# Patient Record
Sex: MALE | Race: WHITE | Employment: OTHER | URBAN - METROPOLITAN AREA
[De-identification: names, ages, dates, MRNs, and addresses within clinical notes are randomized per-mention and may not be internally consistent; named-entity substitution may affect disease eponyms.]

---

## 2018-12-06 ENCOUNTER — OFFICE VISIT (OUTPATIENT)
Dept: VASCULAR SURGERY | Facility: CLINIC | Age: 63
End: 2018-12-06
Payer: COMMERCIAL

## 2018-12-06 VITALS
HEART RATE: 108 BPM | TEMPERATURE: 97.6 F | HEIGHT: 71 IN | SYSTOLIC BLOOD PRESSURE: 206 MMHG | DIASTOLIC BLOOD PRESSURE: 138 MMHG

## 2018-12-06 DIAGNOSIS — L97.929 CHRONIC VENOUS HTN W ULCER AND INFLAM OF BILATERAL LOW EXTRM (HCC): Primary | ICD-10-CM

## 2018-12-06 DIAGNOSIS — L97.919 CHRONIC VENOUS HTN W ULCER AND INFLAM OF BILATERAL LOW EXTRM (HCC): Primary | ICD-10-CM

## 2018-12-06 DIAGNOSIS — I87.333 CHRONIC VENOUS HTN W ULCER AND INFLAM OF BILATERAL LOW EXTRM (HCC): Primary | ICD-10-CM

## 2018-12-06 PROCEDURE — 99203 OFFICE O/P NEW LOW 30 MIN: CPT | Performed by: NURSE PRACTITIONER

## 2018-12-06 RX ORDER — TRAZODONE HYDROCHLORIDE 50 MG/1
TABLET ORAL
Refills: 0 | COMMUNITY
Start: 2018-10-26

## 2018-12-06 RX ORDER — TRAZODONE HYDROCHLORIDE 100 MG/1
TABLET ORAL
Refills: 0 | COMMUNITY
Start: 2018-11-01

## 2018-12-06 RX ORDER — ATORVASTATIN CALCIUM 80 MG/1
TABLET, FILM COATED ORAL
Refills: 0 | COMMUNITY
Start: 2018-11-05

## 2018-12-06 RX ORDER — ACETAMINOPHEN 500 MG
500 TABLET ORAL 2 TIMES DAILY PRN
Refills: 0 | COMMUNITY
Start: 2018-09-26

## 2018-12-06 RX ORDER — NAPROXEN 500 MG/1
TABLET ORAL
Refills: 0 | COMMUNITY
Start: 2018-09-26

## 2018-12-06 NOTE — PROGRESS NOTES
Assessment/Plan:  46-year-old male presents for vascular evaluation  He has history of HTN, HLD, CKD 3, chronic venous stasis changes and ulcerations of the right lateral lower extremity ongoing for the past year  He has tried local wound care w/ Unna boot therapy, compression without a good response  1  Chronic venous hypertension w/ RLE ulcer and secondary lymphedema  -We discussed options to include evaluation with with reflux study versus pneumatic compression pump    -Recommended continued use of compression, periodic leg elevation, attempt exercise and weight management in addition to  Moisturizers  -Recommended addition of pneumatic compression pumps to alleviate swelling, aid in healing and skin fibrosis  -Silver alginate dressing to right lateral calf wound every day covered by double Tubigrip size F  -Will follow up in 3 months after intitiating pneumatic compression  2  HTN   -BP elevated today  Patient anxious  -Follow up with PCP        Problem List Items Addressed This Visit        Cardiovascular and Mediastinum    Chronic venous htn w ulcer and inflam of bilateral low extrm ([address] ) - Primary    Relevant Orders    VAS reflux lower limb venous duplex study with reflux assessment, complete bilateral    Pneumatic compression pumps            Subjective:      Patient ID: Joycelyn Boland is a 66 y o  male  Patient is new to the practice and presents today for bilateral leg pain, weeping and swelling  He has not had any recent testing done  He states that he had been wearing compression for about a year but he felt that they were not helping  Patient complains of burning and tingling in both legs  He gets a burning pain when he ambulates and then rests, however the burning does not always subside with rest  He had been seeing wound care for several months, last time being seen was in August, at which time he was discharged due to wounds being healed       HPI  Patient presents to the office for evaluation of right lateral ulcerations stasis changes  He has had a right calf ulceration for approximately the past year  He has been to wound care as well as multiple doctors at the [place] and in [place]  He has utilized compression without any control  His wounds scab over and re open  He has utilized [employer] in the past   He is frustrated with the process thus far as the wounds continue to reoccur  He had a limited arterial study at outside facility which noted triphasic signals  JESSY on the right 1 0 and left 0 95  He complains of burning, trickling in bilateral lower extremities, right greater than left  He denies any history of deep or superficial venous thrombosis  He has a maternal history of venous disease  He is obese  He previously very active on his bicycle riding 30-50 miles per day though unable to do so since right lower extremity ulceration  He has become quite deconditioned  He elevates his legs occasionally  The following portions of the patient's history were reviewed and updated as appropriate: allergies, current medications, past family history, past medical history, past social history, past surgical history and problem list     Review of Systems   Constitutional: Negative  HENT: Negative  Eyes: Negative  Respiratory: Negative  Cardiovascular: Positive for leg swelling  Gastrointestinal: Negative  Endocrine: Negative  Genitourinary: Negative  Musculoskeletal: Positive for back pain and gait problem  Leg pain  Leg cramping with walking   Skin: Positive for color change and wound  Allergic/Immunologic: Negative  Neurological: Negative for light-headedness and headaches  Hematological: Negative  Psychiatric/Behavioral: Positive for agitation           Objective:    Vitals:    12/06/18 1127   BP: (!) 206/138   BP Location: Right arm   Patient Position: Sitting   Pulse: (!) 108   Temp: 97 6 °F (36 4 °C)   TempSrc: Tympanic Height: 5' 11" (1 803 m)       Patient Active Problem List   Diagnosis    Chronic venous htn w ulcer and inflam of bilateral low extrm ([address] )       Past Surgical History:   Procedure Laterality Date    TONSILLECTOMY         Family History   Problem Relation Age of Onset    Family history unknown: Yes       Social History     Social History    Marital status: Unknown     Spouse name: N/A    Number of children: N/A    Years of education: N/A     Occupational History    Not on file  Social History Main Topics    Smoking status: Current Every Day Smoker     Packs/day: 0 25    Smokeless tobacco: Never Used    Alcohol use Not on file    Drug use: Unknown    Sexual activity: Not on file     Other Topics Concern    Not on file     Social History Narrative    No narrative on file       Allergies   Allergen Reactions    Penicillins          Current Outpatient Prescriptions:     naproxen (NAPROSYN) 500 mg tablet, TAKE 1 TABLET BY MOUTH TWICE A DAY WITH FOOD AS NEEDED FOR KNEE PAIN, Disp: , Rfl: 0    traZODone (DESYREL) 50 mg tablet, TAKE ONE TABLET BY MOUTH EVERY DAY AT BEDTIME  NO REFILLS WITHOUT OFFICE VISIT, Disp: , Rfl: 0    atorvastatin (LIPITOR) 80 mg tablet, TAKE 1 BY MOUTH DAILY FOR CHOLESTEROL   NO REFILLS WITHOUT OFFICE VISIT, Disp: , Rfl: 0    MAPAP 500 MG tablet, Take 500 mg by mouth 2 (two) times a day as needed, Disp: , Rfl: 0    traZODone (DESYREL) 100 mg tablet, TAKE 1 BY MOUTH AT BEDTIME FOR SLEEP  NO REFILLS WITHOUT OFFICE VISIT , Disp: , Rfl: 0         Physical Exam   Constitutional: He is oriented to person, place, and time  He appears well-developed  obese   HENT:   Head: Normocephalic and atraumatic  Poor dentition    Eyes: EOM are normal    Neck: Neck supple  Cardiovascular: Normal heart sounds and intact distal pulses  Pulmonary/Chest: Breath sounds normal    Abdominal: Soft   Bowel sounds are normal    Musculoskeletal:   Bilateral lower extremity 1-2 + edema R>L Neurological: He is alert and oriented to person, place, and time  Skin: Skin is warm  Excessively dry skin bilateral lower extremties w/ R lateral calf 2 cm circumferential scabbed ulceration with weeping and chronic erythema of the distal 2/3 of right lower extremity    Nursing note and vitals reviewed

## 2018-12-06 NOTE — PATIENT INSTRUCTIONS
AMBULATORY CARE:   Varicose veins  are veins that become large, twisted, and swollen  They are common on the back of the calves, knees, and thighs  Varicose veins are caused by valves in your veins that do not work properly  This causes blood to collect and increase pressure in the veins of your legs  The increased pressure causes your veins to stretch, get larger, swell, and twist        Common symptoms include the following: Your symptoms may be worse after you stand or sit for long periods of time  You may have any of the following:  · Blue, purple, or bulging veins in your legs     · Pain, swelling, or muscle cramps in your legs    · Feeling of fatigue or heaviness in your legs    · Cramping in your legs  Seek care immediately if:   · You have a wound that does not heal or is infected  · You have an injury that has broken your skin and caused your varicose veins to bleed  · Your leg is swollen and hard  · You notice that your legs or feet are turning blue or black  · Your leg feels warm, tender, and painful  It may look swollen and red  Contact your healthcare provider if:   · You have pain in your leg that does not go away or gets worse  · You notice sudden large bruising on your legs  · You have a rash on your leg  · Your symptoms keep you from doing your daily activities  · You have questions or concerns about your condition or care  Treatment of varicose veins  aims to decrease symptoms, improve appearance, and prevent further problems  Treatment will depend on which veins are affected and how severe your condition is  You may need procedures to treat or remove your varicose veins  For example, your healthcare provider may inject a solution or use a laser to close the varicose veins  Surgery to remove long veins may also be done  Ask your healthcare provider for more information about procedures used to treat varicose veins    Manage varicose veins:   · Do not sit or stand for long periods of time  This can cause the blood to collect in your legs and make your symptoms worse  Bend or rotate your ankles several times every hour  Walk around for a few minutes every hour to get blood moving in your legs  · Do not cross your legs when you sit  This decreases blood flow to your feet and can make your symptoms worse  · Do not wear tight clothing or shoes  Do not wear high-heeled shoes  Do not wear clothes that are tight around the waist or knees  · Maintain a healthy weight  Being overweight or obese can make your varicose veins worse  Ask your healthcare provider how much you should weigh  Ask him or her to help you create a weight loss plan if you are overweight  · Wear pressure stockings as directed  The stockings are tight and put pressure on your legs  They improve blood flow and help prevent clots  · Elevate your legs  Keep them above the level of your heart for 15 to 30 minutes several times a day  You can also prop the end of your bed up slightly to elevate your legs while you sleep  This will help blood to flow back to your heart  · Get regular exercise  Talk to your healthcare provider about the best exercise plan for you  Exercise can improve blood flow to your legs and feet  Follow up with your healthcare provider as directed:  Write down your questions so you remember to ask them during your visits  © 2017 [address]  Information is for End User's use only and may not be sold, redistributed or otherwise used for commercial purposes  All illustrations and images included in CareNotes® are the copyrighted property of [employer]  or [employer]  The above information is an  only  It is not intended as medical advice for individual conditions or treatments  Talk to your doctor, nurse or pharmacist before following any medical regimen to see if it is safe and effective for you

## 2018-12-10 ENCOUNTER — DOCUMENTATION (OUTPATIENT)
Dept: VASCULAR SURGERY | Facility: CLINIC | Age: 63
End: 2018-12-10

## 2018-12-10 NOTE — PROGRESS NOTES
Patient came in on 12/6/2018 to see Nazario Shankar at check out there was a script faxed over to [facility] for Pneumatic compression pumps  Fax number was [phone]  Demographics page, insurance and a chart note were faxed over using this fax number      Patient was told at check out that [facility] would give a call to f/u

## 2018-12-26 ENCOUNTER — TELEPHONE (OUTPATIENT)
Dept: VASCULAR SURGERY | Facility: CLINIC | Age: 63
End: 2018-12-26

## 2018-12-26 NOTE — TELEPHONE ENCOUNTER
Patient called reporting since his appointment on 39/4 and application of compression, he has developed "many" more sores that are painful  Swelling has improved per patient and he has been compliant with compression and elevation recommendations   Sent to scheduling to make OV

## 2018-12-31 NOTE — PROGRESS NOTES
Assessment/Plan:    67 y/o obese male with HTN, HLD, CKD 3 and chronic venous stasis changes to bilateral lower extremities with venous wounds to RLE  Chronic venous htn w ulcer and inflam of bilateral low extrm ([facility])  -Chronic venous stasis changes BLE w/ inflammation and ulcerations to RLE  Will treat with Purachol and Tubigrip  Tubigrip G applied in office today and Rx given to obtain Tubigrip  -VNA services requested for patient, as he has difficulty changing dressings himself  -Referral given for wound management center  He requests to be seen at [facility]  -He is scheduled to have a venous reflux assessment to evaluate for valvular incompetence  -Encouraged frequent elevation, daily use of Tubigrip, regular activity and weight loss to help manage swelling  -Followup after imaging to discuss results and for wound recheck       Diagnoses and all orders for this visit:    Chronic venous htn w ulcer and inflam of bilateral low extrm ([address] )  -     Ambulatory referral to Wound Care; Future  -     Compression bandages  -     Ambulatory Referral to [address] [place] [address] Dyllan Brennan; Future    Other orders  -     amLODIPine (NORVASC) 10 mg tablet; Take 10 mg by mouth  -     aspirin 81 mg CHEW; Chew 81 mg  -     carvedilol (COREG) 25 mg tablet; Take 25 mg by mouth  -     hydrochlorothiazide (HYDRODIURIL) 25 mg tablet; Take 25 mg by mouth  -     acetaminophen (TYLENOL) 325 mg tablet; Take 325 mg by mouth  -     atorvastatin (LIPITOR) 80 mg tablet; Take 80 mg by mouth  -     naproxen (NAPROSYN) 500 mg tablet; Take 500 mg by mouth  -     traZODone (DESYREL) 50 mg tablet; Take 50 mg by mouth      I have spent 30 minutes with Patient and family today in which greater than 50% of this time was spent in counseling/coordination of care regarding Intructions for management, Patient and family education, Importance of tx compliance and Risk factor reductions  Subjective:      Patient ID: Christi Bowie is a 66 y o  male     Pt is here to have his right lower extremity wounds checked  Pt has redness, pain, swelling and (6) wounds to his lower right leg  Pt gets burning pain to the wounds  Pt's right leg is currently throbbing  Pt gets cramping in his right leg with walking  Pt gets relief within 3 minutes  Pt denies and numbness or tingling  Pt has had no prior surgeries to his lower extremities  Pt is currently taking Lipitor 80 mg  Patient seen for evaluation of worsening right leg venous wounds  He has chronic venous insufficiency with history of chronic swelling and prior wounds  He has been treated with Shashi Holland the past and states he has been to [facility] before  He returns to office today with complaint of new wounds to the RLE  He states that swelling went down, but then he developed sores  Sores are painful  He is treating with "lotion "  He wore Tubigrip for several days after last visit, but then states it wore out, so he stopped wearing it  He elevates often  Easily palpable PT pulse on right  The following portions of the patient's history were reviewed and updated as appropriate: allergies, current medications, past family history, past medical history, past social history, past surgical history and problem list     Review of Systems   Constitutional: Negative  Eyes: Positive for photophobia  Cardiovascular: Positive for leg swelling  Gastrointestinal: Negative  Endocrine: Negative  Genitourinary: Negative  Musculoskeletal: Positive for arthralgias and neck pain  Right leg cramping with walking   Skin: Positive for color change and wound  Allergic/Immunologic: Positive for environmental allergies  Neurological: Positive for headaches  Hematological: Negative  Psychiatric/Behavioral: The patient is nervous/anxious  Objective:       Physical Exam   Constitutional: He is oriented to person, place, and time  No distress  Obese   HENT:   Head: Normocephalic and atraumatic  Eyes: No scleral icterus  Neck: Neck supple  No JVD present  Cardiovascular:   Pulses:       Dorsalis pedis pulses are 0 on the right side  Posterior tibial pulses are 2+ on the right side  Pulmonary/Chest: Effort normal  No respiratory distress  Abdominal: Soft  Obese   Musculoskeletal: He exhibits edema (+1 edema BLE)  Neurological: He is alert and oriented to person, place, and time  Skin:   BLE venous stasis changes, multiple venous ulcerations to right lateral calf (see clinical images)   Psychiatric: He has a normal mood and affect  Vitals:    01/02/19 1507   BP: (!) 178/104   BP Location: Right arm   Patient Position: Sitting   Cuff Size: Extra-Large   Pulse: 76   Resp: 18   Temp: 98 9 °F (37 2 °C)   TempSrc: Tympanic   Weight: (!) 142 kg (314 lb)   Height: 5' 10" (1 778 m)       Patient Active Problem List   Diagnosis    Chronic venous htn w ulcer and inflam of bilateral low extrm ([address] )       Past Surgical History:   Procedure Laterality Date    TONSILLECTOMY         Family History   Problem Relation Age of Onset    Family history unknown: Yes       Social History     Social History    Marital status: Unknown     Spouse name: N/A    Number of children: N/A    Years of education: N/A     Occupational History    Not on file       Social History Main Topics    Smoking status: Current Every Day Smoker     Packs/day: 0 25    Smokeless tobacco: Never Used    Alcohol use Not on file    Drug use: Unknown    Sexual activity: Not on file     Other Topics Concern    Not on file     Social History Narrative    No narrative on file       Allergies   Allergen Reactions    Bee Venom Swelling    Penicillins Swelling         Current Outpatient Prescriptions:     acetaminophen (TYLENOL) 325 mg tablet, Take 325 mg by mouth, Disp: , Rfl:     amLODIPine (NORVASC) 10 mg tablet, Take 10 mg by mouth, Disp: , Rfl:     aspirin 81 mg CHEW, Chew 81 mg, Disp: , Rfl:     atorvastatin (LIPITOR) 80 mg tablet, TAKE 1 BY MOUTH DAILY FOR CHOLESTEROL   NO REFILLS WITHOUT OFFICE VISIT, Disp: , Rfl: 0    carvedilol (COREG) 25 mg tablet, Take 25 mg by mouth, Disp: , Rfl:     hydrochlorothiazide (HYDRODIURIL) 25 mg tablet, Take 25 mg by mouth, Disp: , Rfl:     MAPAP 500 MG tablet, Take 500 mg by mouth 2 (two) times a day as needed, Disp: , Rfl: 0    naproxen (NAPROSYN) 500 mg tablet, TAKE 1 TABLET BY MOUTH TWICE A DAY WITH FOOD AS NEEDED FOR KNEE PAIN, Disp: , Rfl: 0    traZODone (DESYREL) 100 mg tablet, TAKE 1 BY MOUTH AT BEDTIME FOR SLEEP  NO REFILLS WITHOUT OFFICE VISIT , Disp: , Rfl: 0    atorvastatin (LIPITOR) 80 mg tablet, Take 80 mg by mouth, Disp: , Rfl:     naproxen (NAPROSYN) 500 mg tablet, Take 500 mg by mouth, Disp: , Rfl:     traZODone (DESYREL) 50 mg tablet, TAKE ONE TABLET BY MOUTH EVERY DAY AT BEDTIME   NO REFILLS WITHOUT OFFICE VISIT, Disp: , Rfl: 0    traZODone (DESYREL) 50 mg tablet, Take 50 mg by mouth, Disp: , Rfl:

## 2019-01-02 ENCOUNTER — OFFICE VISIT (OUTPATIENT)
Dept: VASCULAR SURGERY | Facility: CLINIC | Age: 64
End: 2019-01-02

## 2019-01-02 VITALS
RESPIRATION RATE: 18 BRPM | HEIGHT: 70 IN | BODY MASS INDEX: 44.95 KG/M2 | TEMPERATURE: 98.9 F | DIASTOLIC BLOOD PRESSURE: 104 MMHG | HEART RATE: 76 BPM | WEIGHT: 314 LBS | SYSTOLIC BLOOD PRESSURE: 178 MMHG

## 2019-01-02 DIAGNOSIS — I87.333 CHRONIC VENOUS HTN W ULCER AND INFLAM OF BILATERAL LOW EXTRM (HCC): Primary | ICD-10-CM

## 2019-01-02 DIAGNOSIS — L97.929 CHRONIC VENOUS HTN W ULCER AND INFLAM OF BILATERAL LOW EXTRM (HCC): Primary | ICD-10-CM

## 2019-01-02 DIAGNOSIS — L97.919 CHRONIC VENOUS HTN W ULCER AND INFLAM OF BILATERAL LOW EXTRM (HCC): Primary | ICD-10-CM

## 2019-01-02 PROCEDURE — 99214 OFFICE O/P EST MOD 30 MIN: CPT | Performed by: NURSE PRACTITIONER

## 2019-01-02 RX ORDER — NAPROXEN 500 MG/1
500 TABLET ORAL
COMMUNITY

## 2019-01-02 RX ORDER — TRAZODONE HYDROCHLORIDE 50 MG/1
50 TABLET ORAL
COMMUNITY

## 2019-01-02 RX ORDER — HYDROCHLOROTHIAZIDE 25 MG/1
25 TABLET ORAL
COMMUNITY

## 2019-01-02 RX ORDER — ASPIRIN 81 MG/1
81 TABLET, CHEWABLE ORAL
COMMUNITY

## 2019-01-02 RX ORDER — ATORVASTATIN CALCIUM 80 MG/1
80 TABLET, FILM COATED ORAL
COMMUNITY

## 2019-01-02 RX ORDER — ACETAMINOPHEN 325 MG/1
325 TABLET ORAL
COMMUNITY

## 2019-01-02 RX ORDER — AMLODIPINE BESYLATE 10 MG/1
10 TABLET ORAL
COMMUNITY

## 2019-01-02 RX ORDER — CARVEDILOL 25 MG/1
25 TABLET ORAL
COMMUNITY

## 2019-01-02 NOTE — PATIENT INSTRUCTIONS
Chronic venous insufficiency with right leg wounds  -these wounds should be treated daily with Purachol dressing followed by a double layer of Tubigrip size F  Visiting nurse services will be arranged for you  You were given a script to obtain a roll of Tubigrip in office today    Replace Tubigrip with a new cut ever 10-14 days  -you will have a venous reflux assessment to evaluate the veins in your legs  -other things we discussed that may help manage your swelling:  Periodic elevation, regular physical activity including riding your bike, and weight loss    -Followup in 2-3 weeks for wound recheck and to review venous imaging study

## 2019-01-02 NOTE — ASSESSMENT & PLAN NOTE
-Chronic venous stasis changes BLE w/ inflammation and ulcerations to RLE  Will treat with Purachol and Tubigrip  Tubigrip G applied in office today and Rx given to obtain Tubigrip  -VNA services requested for patient, as he has difficulty changing dressings himself  -Referral given for wound management center    He requests to be seen at [facility]  -He is scheduled to have a venous reflux assessment to evaluate for valvular incompetence  -Encouraged frequent elevation, daily use of Tubigrip, regular activity and weight loss to help manage swelling  -Followup after imaging to discuss results and for wound recheck

## 2019-01-07 ENCOUNTER — TELEPHONE (OUTPATIENT)
Dept: VASCULAR SURGERY | Facility: CLINIC | Age: 64
End: 2019-01-07

## 2019-01-15 ENCOUNTER — TELEPHONE (OUTPATIENT)
Dept: VASCULAR SURGERY | Facility: CLINIC | Age: 64
End: 2019-01-15

## 2019-01-15 NOTE — TELEPHONE ENCOUNTER
Study was very limited, it's noted on the report that he would only allow the lower part of his right leg to be scanned below the knee  There was no evidence of venous insufficiency on the study in that limited part of the leg  Please let him know    Thanks